# Patient Record
Sex: FEMALE | Race: WHITE | URBAN - METROPOLITAN AREA
[De-identification: names, ages, dates, MRNs, and addresses within clinical notes are randomized per-mention and may not be internally consistent; named-entity substitution may affect disease eponyms.]

---

## 2017-02-06 ENCOUNTER — OFFICE VISIT (OUTPATIENT)
Dept: URGENT CARE | Facility: RETAIL CLINIC | Age: 32
End: 2017-02-06
Payer: COMMERCIAL

## 2017-02-06 VITALS — HEART RATE: 66 BPM | TEMPERATURE: 97.7 F | DIASTOLIC BLOOD PRESSURE: 73 MMHG | SYSTOLIC BLOOD PRESSURE: 128 MMHG

## 2017-02-06 DIAGNOSIS — H60.501 ACUTE OTITIS EXTERNA OF RIGHT EAR, UNSPECIFIED TYPE: Primary | ICD-10-CM

## 2017-02-06 PROCEDURE — 99213 OFFICE O/P EST LOW 20 MIN: CPT | Performed by: PHYSICIAN ASSISTANT

## 2017-02-06 RX ORDER — OFLOXACIN 3 MG/ML
5 SOLUTION AURICULAR (OTIC) 2 TIMES DAILY
Qty: 5 ML | Refills: 0 | Status: SHIPPED | OUTPATIENT
Start: 2017-02-06 | End: 2017-02-13

## 2017-02-06 NOTE — PATIENT INSTRUCTIONS
Antibiotic drops twice a day for 7 days  Symptomatic measures reviewed including over the counter pain reliever such as tylenol or ibuprofen as needed.   Keep water out of ear until the infection is cleared.   During showers this week, use cotton ball in ear to keep water out of ear.  May swim with wax ear plug in affected ear this week if not painful.   No Qtips.   Warm compress next to ear   Please follow up with primary care provider if not improving, worsening or new symptoms or for any adverse reactions to medications.

## 2017-02-06 NOTE — MR AVS SNAPSHOT
"              After Visit Summary   2017    Nika Stevens    MRN: [id number]           Patient Information     Date Of Birth          1985        Visit Information        Provider Department      2017 12:20 PM Michelle Oliver PA-C [facility]        Today's Diagnoses     Acute otitis externa of right ear, unspecified type    -  1       Care Instructions    Antibiotic drop twice a day for 7 days  Symptomatic measures reviewed including over the counter pain reliever such as tylenol or ibuprofen as needed.   Keep water out of ear until the infection is cleared.   During showers this week, use cotton ball in ear to keep water out of ear.  May swim with wax ear plug in affected ear this week if not painful.   No Qtips.   Warm compress next to ear   Please follow up with primary care provider if not improving, worsening or new symptoms or for any adverse reactions to medications.         Follow-ups after your visit        Who to contact     You can reach your care team any time of the day by calling [phone].  Notification of test results:  If you have an abnormal lab result, we will notify you by phone as soon as possible.         Additional Information About Your Visit        MyChart Information     [facility] lets you send messages to your doctor, view your test results, renew your prescriptions, schedule appointments and more. To sign up, go to [email or web address] . Click on \"Log in\" on the left side of the screen, which will take you to the Welcome page. Then click on \"Sign up Now\" on the right side of the page.     You will be asked to enter the access code listed below, as well as some personal information. Please follow the directions to create your username and password.     Your access code is: [email or web address]  Expires: 2017  1:08 PM     Your access code will  in 90 days. If you need help or a new code, please call your [place] clinic or [phone].   "      Care EveryWhere ID     This is your Care EveryWhere ID. This could be used by other organizations to access your [place] medical records  [id number]        Your Vitals Were     Pulse Temperature                66 97.7  F (36.5  C) (Oral)           Blood Pressure from Last 3 Encounters:   02/06/17 128/73   05/16/16 111/60    Weight from Last 3 Encounters:   No data found for Wt              Today, you had the following     No orders found for display         Today's Medication Changes          These changes are accurate as of: 2/6/17  1:08 PM.  If you have any questions, ask your nurse or doctor.               Start taking these medicines.        Dose/Directions    ofloxacin 0.3 % otic solution   Commonly known as:  FLOXIN   Used for:  Acute otitis externa of right ear, unspecified type        Dose:  5 drop   Place 5 drops into the right ear 2 times daily for 7 days Can sub ophth drops if needed   Quantity:  5 mL   Refills:  0            Where to get your medicines      These medications were sent to [place] [id number] [facility] [address] [address] [place] [address]  [address] [address] [place] [id number]     Phone:  [phone]    - ofloxacin 0.3 % otic solution             Primary Care Provider    None Specified       No primary provider on file.        Thank you!     Thank you for choosing [facility]  for your care. Our goal is always to provide you with excellent care. Hearing back from our patients is one way we can continue to improve our services. Please take a few minutes to complete the written survey that you may receive in the mail after your visit with us. Thank you!             Your Updated Medication List - Protect others around you: Learn how to safely use, store and throw away your medicines at www.disposemymeds.org.          This list is accurate as of: 2/6/17  1:08 PM.  Always use your most recent med list.                   Brand Name Dispense Instructions for use    ofloxacin 0.3 % otic  solution    FLOXIN    5 mL    Place 5 drops into the right ear 2 times daily for 7 days Can sub ophth drops if needed

## 2017-02-06 NOTE — PROGRESS NOTES
Chief Complaint   Patient presents with     Otalgia     mostly Right     Headache     mild     Nasal Congestion     mild stuffiness      SUBJECTIVE:  Nika Stevens is a 31 year old female who presents with right ear pain started about 9 days ago, last for several days, then seemed to improve, but then returned yesterday - since today  Severity: mild   Timing:gradual onset and still present  Additional symptoms include mild stuffy nose, mild headache  History of recurrent otitis: no  Teaches swim lessons    No past medical history on file.    Medications - none     No Known Allergies     History   Smoking status     Not on file   Smokeless tobacco     Not on file       ROS:   Review of systems negative except as stated above.    OBJECTIVE:  /73 mmHg  Pulse 66  Temp(Src) 97.7  F (36.5  C) (Oral)  The right TM is normal: no effusions, no erythema, and normal landmarks     The right auditory canal is erythematous and swollen proximally  The left TM is gray with serous effusion  The left auditory canal is normal and without drainage, edema or erythema  Oropharynx exam is normal: no lesions, erythema, adenopathy or exudate.  GENERAL: no acute distress  NECK: supple, non-tender to palpation, no adenopathy noted  SKIN: no suspicious lesions or rashes     ASSESSMENT:  Acute otitis externa of right ear, unspecified type [H60.501]    PLAN:  OFLOXACIN 0.3 % OT SOLN  Antibiotic drops twice a day for 7 days  Symptomatic measures reviewed including over the counter pain reliever such as tylenol or ibuprofen as needed.   Keep water out of ear until the infection is cleared.   During showers this week, use cotton ball in ear to keep water out of ear.  May swim with wax ear plug in affected ear this week if not painful.   No Qtips.   Warm compress next to ear   Please follow up with primary care provider if not improving, worsening or new symptoms or for any adverse reactions to medications.     Michelle Oliver,  MOJGAN  [facility]

## 2018-01-15 ENCOUNTER — TELEPHONE (OUTPATIENT)
Dept: FAMILY MEDICINE | Facility: OTHER | Age: 33
End: 2018-01-15

## 2018-01-15 VITALS
RESPIRATION RATE: 20 BRPM | OXYGEN SATURATION: 99 % | HEART RATE: 71 BPM | DIASTOLIC BLOOD PRESSURE: 93 MMHG | SYSTOLIC BLOOD PRESSURE: 154 MMHG | TEMPERATURE: 97.7 F

## 2018-01-15 NOTE — TELEPHONE ENCOUNTER
Nika Stevens is a 32 year old female who presents with severe abdominal pain.    NURSING ASSESSMENT:  Description:  Severe abdominal pain started around 8am. RLQ the wraps to the back, pain is 7-8/10 that feels like labor pains, sharp, shooting and constant. Having nausea and had 6 vomiting episodes today. Feels very hot per patient. Patient is hunched over in the chair, sitting knees to chest, pain worsens with extending her legs. Had concerns of constipation, but had 2 small BMs this morning with no change to pain. CDL evaluated the abdomen and recommended evaluation in the ED. Patient has her appendix.   Blood pressure: 154/93, pulse 71, oxygen room air: 99%, temperature: 97.7F orally taken, respirations 20.  Onset/duration:  Today around 8am  Precip. factors: none  Associated symptoms:  Abdominal pain,   Improves/worsens symptoms:  worsening  Pain scale (0-10)   7-8/10  LMP/preg/breast feeding:  Not addressed  Last exam/Treatment:  02/06/20107 at Urgent Care  Allergies: No Known Allergies    NURSING PLAN: Huddle with provider, plan includes patient to be further evaluted in the ED    RECOMMENDED DISPOSITION:  To ED, another person to drive - discussed options, stated would go to  ED  Will comply with recommendation: Yes  If further questions/concerns or if symptoms do not improve, worsen or new symptoms develop, call your PCP or [facility] as soon as possible.    NOTES:  Disposition was determined by the first positive assessment question, therefore all previous assessment questions were negative    Guideline used:  Telephone Triage Protocols for Nurses, Fifth Edition, Leanne Alicea  Abdominal Pain, Adult  Nursing Judgment  Huddled with JOLIEL and LIAAN DAVEL evaluated the patient.     Ling Gonzalez, RN, BSN